# Patient Record
Sex: FEMALE | Race: WHITE
[De-identification: names, ages, dates, MRNs, and addresses within clinical notes are randomized per-mention and may not be internally consistent; named-entity substitution may affect disease eponyms.]

---

## 2017-10-30 ENCOUNTER — HOSPITAL ENCOUNTER (OUTPATIENT)
Dept: HOSPITAL 62 - RAD | Age: 62
End: 2017-10-30
Attending: UROLOGY
Payer: COMMERCIAL

## 2017-10-30 DIAGNOSIS — N20.0: Primary | ICD-10-CM

## 2017-10-30 PROCEDURE — 74000: CPT

## 2017-10-30 NOTE — RADIOLOGY REPORT (SQ)
EXAM DESCRIPTION:  KUB/ABDOMEN (SINGLE VIEW)



COMPLETED DATE/TIME:  10/30/2017 2:43 pm



REASON FOR STUDY:  KIDNEY STONE (N20.0) N20.0  CALCULUS OF KIDNEY



COMPARISON:  None.



NUMBER OF VIEWS:  One view.



TECHNIQUE:   Supine radiographic image of the abdomen acquired.



LIMITATIONS:  None.



FINDINGS:  BOWEL GAS PATTERN: Normal bowel gas pattern. No dilated loops.

CALCIFICATIONS: Small calcification overlies the lower pole the left kidney.  There are some nonspeci
fic pelvic calcifications including a 2 mm calcification on the right side that could be in the dista
l ureter.

SOFT TISSUES: No gross mass or suggestion of organomegaly.

HARDWARE: None in the abdomen.

BONES: No acute fracture. No worrisome bone lesions.

OTHER: No other significant finding.



IMPRESSION:  Calcifications as described.



TECHNICAL DOCUMENTATION:  JOB ID:  [id number]

 2011 [employer]- All Rights Reserved

## 2018-10-23 ENCOUNTER — HOSPITAL ENCOUNTER (OUTPATIENT)
Dept: HOSPITAL 62 - SP | Age: 63
End: 2018-10-23
Payer: COMMERCIAL

## 2018-10-23 DIAGNOSIS — R06.02: Primary | ICD-10-CM

## 2018-10-23 PROCEDURE — 93306 TTE W/DOPPLER COMPLETE: CPT

## 2018-10-30 ENCOUNTER — HOSPITAL ENCOUNTER (OUTPATIENT)
Dept: HOSPITAL 62 - RAD | Age: 63
End: 2018-10-30
Payer: COMMERCIAL

## 2018-10-30 DIAGNOSIS — R06.02: Primary | ICD-10-CM

## 2018-10-30 PROCEDURE — 93017 CV STRESS TEST TRACING ONLY: CPT

## 2018-10-30 PROCEDURE — 78452 HT MUSCLE IMAGE SPECT MULT: CPT

## 2018-10-30 PROCEDURE — A9500 TC99M SESTAMIBI: HCPCS

## 2018-10-31 NOTE — RADIOLOGY REPORT
STRESS TEST REPORT



PATIENT NAME:  BASHIR WEINSTEIN

MRN:  [id number]       [id number]     [facility]

DATE OF SERVICE:  10/30/2018                     AGE:  63Y

ORDER#:  [id number]

 



INDICATION:

For assessment of shortness of breath.



PROCEDURE PERFORMED:

Rest/stress single isotope Cardiolite SPECT imaging with IV Lexiscan

stress and gated SPECT imaging.



CLINICAL HISTORY:

This is a 63-year-old female with no known coronary artery disease, but

has a family history of premature coronary artery disease and hypertension.

Current symptomatology includes palpitations and shortness of

breath and exertional dyspnea.



REPORT

The patient received IV Lexiscan of 0.4 mg infused over 10 seconds.  The

resting heart rate was 92 bpm and increased to 136 bpm at end infusion. 

The resting blood pressure was 134/70 and increased to 138/79 at end

infusion.  The patient had symptoms of shortness of breath, but no chest

pains.



The resting 12-lead EKG showed normal sinus rhythm at 92 bpm with

nonspecific inferolateral changes.  At end infusion, heart rate went up to

136 bpm and there was further increase in ST depression in the

inferolateral leads.



Myocardial perfusion imaging was performed at rest 60 minutes following

the injection of 10.18 mCi of Cardiolite.  Ten seconds after the IV

Lexiscan injection, the patient was injected with 31.9 mCi of Cardiolite. 

Gated poststress tomographic imaging was performed 60 minutes after

stress.



SUMMARY OF FINDINGS/IMPRESSION:

The overall quality of the study is good.



The left ventricular cavity is noted to be normal in size on both the rest

and stress studies.  There is no abnormal transient ischemic dilatation of

the left ventricle.  TID ratio calculated by computer was 1.24, which is

not confirmed visually.



SPECT images showed no evidence of any reversible ischemia and no fixed

effusion defect. The gated SPECT imaging showed normal contraction and

wall motion of all LV segments.  The left ventricular ejection fraction

was calculated to be 63%.



IMPRESSION:

Myocardial perfusion imaging is normal.  There is no evidence of IV

Lexiscan-induced reversible ischemia and no fixed perfusion defect. 

Overall left ventricular systolic function was normal at 63% and there

were no regional wall motion abnormalities noted.  No prior study for

comparison.





INTERPRETING PHYSICIAN:  BRANDT MERAZ M.D.









/:  [id number]       DT:  10/31/2018 TT:  0629        ID:  [id number]

/:  [id number]       DD:  10/30/2018 TD:  0905       JOB:  [id number]



cc:BRANDT MERAZ M.D.

>









STUART

## 2019-02-28 ENCOUNTER — HOSPITAL ENCOUNTER (OUTPATIENT)
Dept: HOSPITAL 62 - LAB | Age: 64
End: 2019-02-28
Attending: NURSE PRACTITIONER
Payer: COMMERCIAL

## 2019-02-28 DIAGNOSIS — Z12.39: ICD-10-CM

## 2019-02-28 DIAGNOSIS — Z13.220: ICD-10-CM

## 2019-02-28 DIAGNOSIS — Z13.29: ICD-10-CM

## 2019-02-28 DIAGNOSIS — Z00.00: Primary | ICD-10-CM

## 2019-02-28 DIAGNOSIS — Z13.1: ICD-10-CM

## 2019-02-28 LAB
ADD MANUAL DIFF: NO
ALBUMIN SERPL-MCNC: 4.7 G/DL (ref 3.5–5)
ALP SERPL-CCNC: 83 U/L (ref 38–126)
ALT SERPL-CCNC: 22 U/L (ref 9–52)
ANION GAP SERPL CALC-SCNC: 10 MMOL/L (ref 5–19)
AST SERPL-CCNC: 26 U/L (ref 14–36)
BASOPHILS # BLD AUTO: 0 10^3/UL (ref 0–0.2)
BASOPHILS NFR BLD AUTO: 0.6 % (ref 0–2)
BILIRUB DIRECT SERPL-MCNC: 0.2 MG/DL (ref 0–0.4)
BILIRUB SERPL-MCNC: 0.6 MG/DL (ref 0.2–1.3)
BUN SERPL-MCNC: 9 MG/DL (ref 7–20)
CALCIUM: 10.1 MG/DL (ref 8.4–10.2)
CHLORIDE SERPL-SCNC: 107 MMOL/L (ref 98–107)
CHOLEST SERPL-MCNC: 236.35 MG/DL (ref 0–200)
CO2 SERPL-SCNC: 27 MMOL/L (ref 22–30)
EOSINOPHIL # BLD AUTO: 0.1 10^3/UL (ref 0–0.6)
EOSINOPHIL NFR BLD AUTO: 1.4 % (ref 0–6)
ERYTHROCYTE [DISTWIDTH] IN BLOOD BY AUTOMATED COUNT: 14.2 % (ref 11.5–14)
GLUCOSE SERPL-MCNC: 101 MG/DL (ref 75–110)
HCT VFR BLD CALC: 44.1 % (ref 36–47)
HGB BLD-MCNC: 14.9 G/DL (ref 12–15.5)
LDLC SERPL DIRECT ASSAY-MCNC: 122 MG/DL (ref ?–100)
LYMPHOCYTES # BLD AUTO: 3.2 10^3/UL (ref 0.5–4.7)
LYMPHOCYTES NFR BLD AUTO: 47.7 % (ref 13–45)
MCH RBC QN AUTO: 31.1 PG (ref 27–33.4)
MCHC RBC AUTO-ENTMCNC: 33.7 G/DL (ref 32–36)
MCV RBC AUTO: 92 FL (ref 80–97)
MONOCYTES # BLD AUTO: 0.4 10^3/UL (ref 0.1–1.4)
MONOCYTES NFR BLD AUTO: 6.5 % (ref 3–13)
NEUTROPHILS # BLD AUTO: 2.9 10^3/UL (ref 1.7–8.2)
NEUTS SEG NFR BLD AUTO: 43.8 % (ref 42–78)
PLATELET # BLD: 259 10^3/UL (ref 150–450)
POTASSIUM SERPL-SCNC: 4.6 MMOL/L (ref 3.6–5)
PROT SERPL-MCNC: 7.4 G/DL (ref 6.3–8.2)
RBC # BLD AUTO: 4.77 10^6/UL (ref 3.72–5.28)
SODIUM SERPL-SCNC: 143.9 MMOL/L (ref 137–145)
TOTAL CELLS COUNTED % (AUTO): 100 %
TRIGL SERPL-MCNC: 70 MG/DL (ref ?–150)
VLDLC SERPL CALC-MCNC: 14 MG/DL (ref 10–31)
WBC # BLD AUTO: 6.6 10^3/UL (ref 4–10.5)

## 2019-02-28 PROCEDURE — 85025 COMPLETE CBC W/AUTO DIFF WBC: CPT

## 2019-02-28 PROCEDURE — 80053 COMPREHEN METABOLIC PANEL: CPT

## 2019-02-28 PROCEDURE — 83036 HEMOGLOBIN GLYCOSYLATED A1C: CPT

## 2019-02-28 PROCEDURE — 80061 LIPID PANEL: CPT

## 2019-02-28 PROCEDURE — 84443 ASSAY THYROID STIM HORMONE: CPT

## 2019-02-28 PROCEDURE — 36415 COLL VENOUS BLD VENIPUNCTURE: CPT

## 2020-08-27 ENCOUNTER — HOSPITAL ENCOUNTER (OUTPATIENT)
Dept: HOSPITAL 62 - SC | Age: 65
Discharge: HOME | End: 2020-08-27
Attending: OPHTHALMOLOGY
Payer: COMMERCIAL

## 2020-08-27 DIAGNOSIS — H25.11: Primary | ICD-10-CM

## 2020-08-27 DIAGNOSIS — Z87.891: ICD-10-CM

## 2020-08-27 DIAGNOSIS — Z96.1: ICD-10-CM

## 2020-08-27 PROCEDURE — 66984 XCAPSL CTRC RMVL W/O ECP: CPT

## 2020-08-27 PROCEDURE — V2788 PRESBYOPIA-CORRECT FUNCTION: HCPCS

## 2020-08-27 RX ADMIN — BESIFLOXACIN PRN DROP: 6 SUSPENSION OPHTHALMIC at 10:59

## 2020-08-27 RX ADMIN — TROPICAMIDE PRN DROP: 10 SOLUTION/ DROPS OPHTHALMIC at 11:06

## 2020-08-27 RX ADMIN — TETRACAINE HYDROCHLORIDE PRN DROP: 5 SOLUTION OPHTHALMIC at 11:15

## 2020-08-27 RX ADMIN — DORZOLAMIDE HYDROCHLORIDE AND TIMOLOL MALEATE PRN DROP: 20; 5 SOLUTION OPHTHALMIC at 11:40

## 2020-08-27 RX ADMIN — CYCLOPENTOLATE HYDROCHLORIDE AND PHENYLEPHRINE HYDROCHLORIDE PRN DROP: 2; 10 SOLUTION/ DROPS OPHTHALMIC at 10:59

## 2020-08-27 RX ADMIN — Medication ONE ML: at 11:26

## 2020-08-27 RX ADMIN — CYCLOPENTOLATE HYDROCHLORIDE AND PHENYLEPHRINE HYDROCHLORIDE PRN DROP: 2; 10 SOLUTION/ DROPS OPHTHALMIC at 10:50

## 2020-08-27 RX ADMIN — SODIUM CHONDROITIN SULFATE / SODIUM HYALURONATE ONE EACH: 0.55-0.5 INJECTION INTRAOCULAR at 11:26

## 2020-08-27 RX ADMIN — TETRACAINE HYDROCHLORIDE PRN DROP: 5 SOLUTION OPHTHALMIC at 11:17

## 2020-08-27 RX ADMIN — BESIFLOXACIN PRN DROP: 6 SUSPENSION OPHTHALMIC at 10:51

## 2020-08-27 RX ADMIN — EPINEPHRINE ONE MG: 1 INJECTION, SOLUTION, CONCENTRATE INTRAVENOUS at 11:26

## 2020-08-27 RX ADMIN — TROPICAMIDE PRN DROP: 10 SOLUTION/ DROPS OPHTHALMIC at 10:51

## 2020-08-27 RX ADMIN — BESIFLOXACIN PRN DROP: 6 SUSPENSION OPHTHALMIC at 00:00

## 2020-08-27 RX ADMIN — BESIFLOXACIN PRN DROP: 6 SUSPENSION OPHTHALMIC at 11:40

## 2020-08-27 RX ADMIN — EPINEPHRINE ONE MG: 1 INJECTION, SOLUTION, CONCENTRATE INTRAVENOUS at 00:00

## 2020-08-27 RX ADMIN — TROPICAMIDE PRN DROP: 10 SOLUTION/ DROPS OPHTHALMIC at 10:59

## 2020-08-27 RX ADMIN — DORZOLAMIDE HYDROCHLORIDE AND TIMOLOL MALEATE PRN DROP: 20; 5 SOLUTION OPHTHALMIC at 00:00

## 2020-08-27 RX ADMIN — CYCLOPENTOLATE HYDROCHLORIDE AND PHENYLEPHRINE HYDROCHLORIDE PRN DROP: 2; 10 SOLUTION/ DROPS OPHTHALMIC at 11:06

## 2020-08-27 RX ADMIN — Medication ONE ML: at 00:00

## 2020-08-27 RX ADMIN — TETRACAINE HYDROCHLORIDE PRN DROP: 5 SOLUTION OPHTHALMIC at 10:50

## 2020-08-27 RX ADMIN — SODIUM CHONDROITIN SULFATE / SODIUM HYALURONATE ONE EACH: 0.55-0.5 INJECTION INTRAOCULAR at 00:00

## 2020-08-27 NOTE — OPERATIVE REPORT
Operative Report-[facility]


Operative Report: 





DATE OF SURGERY: 8/27/20


PREOPERATIVE DIAGNOSIS: Cataract, right eye


POSTOPERATIVE DIAGNOSIS: Cataract, right eye


OPERATION: Cataract extraction with insertion of an panoptix IOL of the right 

eye.


Intraocular Lens Model: [22.0 tfnt00] patient underwent surgery for difficulty 

seeing road signs


SURGEON: Billy Jones MD


ANESTHESIA: Topical


PROCEDURE: After obtaining appropriate consent, the patient's right eye was 

prepped and draped in a sterile fashion as well as the surgeon in the sterile 

manner and cataract surgery was started. First a paracentesis blade was used to 

make a side-port incision. Viscoelastic was used to inflate the anterior 

chamber. Next a 2.4 mm incision was made with a 2.4 mm blade, clear corneal 

temporarily. A continuous capsulorrhexis was made using a cystotome and Utrata 

forceps. Following this hydrodissection was carried out to make the ankit fully 

loose and mobile and it was rotated. Following this, a divide and conquer 

technique was used to phacoemulsify the ankit. The remaining cortex was removed 

with an irrigation/aspiration. Provisc was instilled into the capsular bag to 

inflate the bag. The intraocular lens was placed. The remaining viscoelastic 

material was removed with irrigation/aspiration. Following this, the incision 

was found to be watertight. Besivance and Cosopt was instilled into the eye and 

a protective shield was placed over the eye. The patient was reurned to the 

postoperative recovery in a stable condition.